# Patient Record
Sex: FEMALE | Race: WHITE | NOT HISPANIC OR LATINO | URBAN - NONMETROPOLITAN AREA
[De-identification: names, ages, dates, MRNs, and addresses within clinical notes are randomized per-mention and may not be internally consistent; named-entity substitution may affect disease eponyms.]

---

## 2017-01-26 ENCOUNTER — OFFICE VISIT (OUTPATIENT)
Dept: CARDIOLOGY | Facility: CLINIC | Age: 52
End: 2017-01-26

## 2017-01-26 ENCOUNTER — TELEPHONE (OUTPATIENT)
Dept: CARDIOLOGY | Facility: CLINIC | Age: 52
End: 2017-01-26

## 2017-01-26 VITALS
WEIGHT: 293 LBS | SYSTOLIC BLOOD PRESSURE: 113 MMHG | HEIGHT: 60 IN | RESPIRATION RATE: 16 BRPM | BODY MASS INDEX: 57.52 KG/M2 | HEART RATE: 63 BPM | DIASTOLIC BLOOD PRESSURE: 58 MMHG

## 2017-01-26 DIAGNOSIS — R07.2 PRECORDIAL PAIN: Primary | ICD-10-CM

## 2017-01-26 DIAGNOSIS — I10 ESSENTIAL HYPERTENSION: ICD-10-CM

## 2017-01-26 DIAGNOSIS — I25.10 ASCVD (ARTERIOSCLEROTIC CARDIOVASCULAR DISEASE): ICD-10-CM

## 2017-01-26 DIAGNOSIS — E78.5 DYSLIPIDEMIA: ICD-10-CM

## 2017-01-26 PROCEDURE — 93000 ELECTROCARDIOGRAM COMPLETE: CPT | Performed by: INTERNAL MEDICINE

## 2017-01-26 PROCEDURE — 99214 OFFICE O/P EST MOD 30 MIN: CPT | Performed by: INTERNAL MEDICINE

## 2017-01-26 RX ORDER — CYCLOBENZAPRINE HCL 5 MG
5 TABLET ORAL 3 TIMES DAILY PRN
COMMUNITY

## 2017-01-26 RX ORDER — MONTELUKAST SODIUM 10 MG/1
10 TABLET ORAL NIGHTLY
COMMUNITY

## 2017-01-26 RX ORDER — PROMETHAZINE HYDROCHLORIDE 25 MG/1
25 TABLET ORAL EVERY 6 HOURS PRN
COMMUNITY
End: 2017-06-07

## 2017-01-26 RX ORDER — LOSARTAN POTASSIUM 50 MG/1
50 TABLET ORAL DAILY
COMMUNITY

## 2017-01-26 RX ORDER — NITROGLYCERIN 0.4 MG/1
0.4 TABLET SUBLINGUAL
COMMUNITY

## 2017-01-26 RX ORDER — POTASSIUM CHLORIDE 1.5 G/1.77G
20 POWDER, FOR SOLUTION ORAL DAILY
COMMUNITY
End: 2017-06-07

## 2017-01-26 RX ORDER — INSULIN GLARGINE 100 [IU]/ML
50 INJECTION, SOLUTION SUBCUTANEOUS DAILY
COMMUNITY

## 2017-01-26 RX ORDER — SPIRONOLACTONE 50 MG/1
50 TABLET, FILM COATED ORAL DAILY
COMMUNITY
End: 2017-08-17

## 2017-01-26 NOTE — TELEPHONE ENCOUNTER
Called and requested her Echo.     ----- Message from Sacha Shaver MD sent at 1/26/2017 11:48 AM EST -----   Please obtain echocardiogram from [facility].

## 2017-01-26 NOTE — LETTER
January 26, 2017     Rosita Flores MD  [address] [place] [id number]    Patient: Guillermina Mcqueen   YOB: 1965   Date of Visit: 1/26/2017       Dear Dr. Mark MD:    Thank you for referring Guillermina Mcqueen to me for evaluation. Below are the relevant portions of my assessment and plan of care.    If you have questions, please do not hesitate to call me. I look forward to following Guillermina along with you.         Sincerely,        Sacha Shaver MD        CC: No Recipients  Sacha Shaver MD  1/26/2017 12:58 PM  Signed  Rosita Flores MD  Guillermina Mcqueen  1965 01/26/2017    Patient Active Problem List   Diagnosis   • Chest pain   • COPD (chronic obstructive pulmonary disease)   • HTN (hypertension)   • Dyslipidemia   • ASCVD (arteriosclerotic cardiovascular disease)       Dear Rosita Flores MD:    Subjective     Guillermina Mcqueen is a 51 y.o. female with the above medical problems who is here today for follow-up.  According to the patient she has continued to present with shortness of breath and was recently hospitalized for severe COPD as well as an episode of respiratory arrest that required resuscitation.  She comes in today for scheduled follow-up and states she is still having episodes of midsternal oppressive chest pain that she describes as lasting 15-20 minutes and resolving spontaneously.  She states they are not associated with exertion but she does not do significant physical activity.  She states they had no ameliorating or exacerbating factors and the onset is random.  She also complains of lower extremity edema that appears to be related to venostasis due to her morbid obesity      Current Outpatient Prescriptions:   •  aspirin 81 MG chewable tablet, Chew., Disp: , Rfl:   •  citalopram (CeleXA) 20 MG tablet, Take 20 mg by mouth daily., Disp: , Rfl:   •  cloNIDine (CATAPRES) 0.1 MG tablet, Take  by mouth., Disp: , Rfl:   •   cyclobenzaprine (FLEXERIL) 5 MG tablet, Take 5 mg by mouth 3 (Three) Times a Day As Needed for muscle spasms., Disp: , Rfl:   •  diltiazem (CARDIZEM) 90 MG tablet, Take 240 mg by mouth daily., Disp: , Rfl:   •  docusate calcium (SURFAK) 240 MG capsule, Take  by mouth., Disp: , Rfl:   •  furosemide (LASIX) 80 MG tablet, Take 80 mg by mouth. 80mg AM, 40mg PM., Disp: , Rfl:   •  glipiZIDE (GLUCOTROL) 10 MG tablet, Take  by mouth., Disp: , Rfl:   •  hydrALAZINE (APRESOLINE) 25 MG tablet, Take 50 mg by mouth 3 (three) times a day., Disp: , Rfl:   •  HYDROcodone-acetaminophen (LORTAB 7.5) 7.5-500 MG per tablet, Take  by mouth., Disp: , Rfl:   •  insulin glargine (LANTUS) 100 UNIT/ML injection, Inject 50 Units under the skin Daily., Disp: , Rfl:   •  insulin lispro (humaLOG) 100 UNIT/ML injection, Inject 25 Units under the skin 4 (Four) Times a Day., Disp: , Rfl:   •  ipratropium (ATROVENT HFA) 17 MCG/ACT inhaler, Inhale 2 puffs 4 (Four) Times a Day., Disp: , Rfl:   •  isosorbide mononitrate (IMDUR) 30 MG 24 hr tablet, Take  by mouth., Disp: , Rfl:   •  levothyroxine (SYNTHROID, LEVOTHROID) 125 MCG tablet, Take 150 mcg by mouth., Disp: , Rfl:   •  LORazepam (ATIVAN) 0.5 MG tablet, Take 0.5 mg by mouth 2 (two) times a day., Disp: , Rfl:   •  losartan (COZAAR) 50 MG tablet, Take 50 mg by mouth Daily., Disp: , Rfl:   •  metoprolol tartrate (LOPRESSOR) 25 MG tablet, TAKE 1 TABLET BY MOUTH TWICE A DAY, Disp: 60 tablet, Rfl: 5  •  mometasone-formoterol (DULERA 100) 100-5 MCG/ACT inhaler, Inhale 2 puffs 2 (two) times a day., Disp: , Rfl:   •  montelukast (SINGULAIR) 10 MG tablet, Take 10 mg by mouth Every Night., Disp: , Rfl:   •  nitroglycerin (NITROSTAT) 0.4 MG SL tablet, Place 0.4 mg under the tongue Every 5 (Five) Minutes As Needed for chest pain. Take no more than 3 doses in 15 minutes., Disp: , Rfl:   •  pantoprazole (PROTONIX) 40 MG pack packet, Take  by mouth., Disp: , Rfl:   •  potassium chloride (K-DUR,KLOR-CON) 20 MEQ  CR tablet, Take 20 mEq by mouth daily., Disp: , Rfl:   •  potassium chloride (KLOR-CON) 20 MEQ packet, Take 20 mEq by mouth Daily., Disp: , Rfl:   •  pravastatin (PRAVACHOL) 20 MG tablet, Take 1 tablet by mouth Daily., Disp: 30 tablet, Rfl: 2  •  pregabalin (LYRICA) 100 MG capsule, Take 100 mg by mouth 3 (three) times a day., Disp: , Rfl:   •  promethazine (PHENERGAN) 25 MG tablet, Take 25 mg by mouth Every 6 (Six) Hours As Needed for nausea or vomiting., Disp: , Rfl:   •  ranitidine (ZANTAC) 150 MG tablet, Take 150 mg by mouth 2 (two) times a day., Disp: , Rfl:   •  rOPINIRole (REQUIP) 1 MG tablet, Take  by mouth., Disp: , Rfl:   •  spironolactone (ALDACTONE) 50 MG tablet, Take 50 mg by mouth Daily., Disp: , Rfl:   •  nitroglycerin (NITRODUR) 0.4 MG/HR patch, Place 1 patch on the skin daily., Disp: , Rfl:     The following portions of the patient's history were reviewed and updated as appropriate: allergies, current medications, past family history, past medical history, past social history, past surgical history and problem list.    Review of Systems   Constitution: Positive for chills. Negative for diaphoresis and fever.   HENT: Positive for headaches and nosebleeds. Negative for sore throat.    Eyes: Negative for blurred vision, pain and redness.   Cardiovascular: Positive for chest pain, leg swelling, orthopnea and paroxysmal nocturnal dyspnea. Negative for palpitations and syncope.   Respiratory: Positive for shortness of breath. Negative for cough, hemoptysis and wheezing.    Endocrine: Negative for cold intolerance and heat intolerance.   Hematologic/Lymphatic: Does not bruise/bleed easily.   Skin: Negative for rash.   Musculoskeletal: Positive for arthritis, back pain, joint pain, joint swelling and stiffness. Negative for myalgias.   Gastrointestinal: Positive for abdominal pain, constipation, diarrhea and heartburn. Negative for hematemesis, hematochezia, melena, nausea and vomiting.   Genitourinary:  "Negative for dysuria and hematuria.   Neurological: Negative for dizziness, focal weakness and numbness.   Psychiatric/Behavioral: Positive for depression. The patient is nervous/anxious.        Objective   Blood pressure 113/58, pulse 63, resp. rate 16, height 60\" (152.4 cm), weight (!) 356 lb (161 kg).    Physical Exam   Constitutional: She is oriented to person, place, and time. She appears well-developed and well-nourished.   Morbidly obese WF sitting on wheelchair. SHe has a nasal cannula in place.   HENT:   Mouth/Throat: Oropharynx is clear and moist.   Eyes: EOM are normal. Pupils are equal, round, and reactive to light.   Neck: Neck supple. No JVD present. No tracheal deviation present. No thyromegaly present.   Cardiovascular: Normal rate, regular rhythm, S1 normal and S2 normal.  Exam reveals no gallop and no friction rub.    No murmur heard.  Distant heart sounds.   Pulmonary/Chest: Effort normal. No respiratory distress. She has no wheezes. She has rales.   Distant breath sounds.   Abdominal: Soft. Bowel sounds are normal. She exhibits no mass. There is no tenderness.   Musculoskeletal: Normal range of motion. She exhibits no edema.   Lymphadenopathy:     She has no cervical adenopathy.   Neurological: She is alert and oriented to person, place, and time.   Skin: Skin is warm and dry. No rash noted.   Psychiatric: She has a normal mood and affect.         ECG 12 Lead  Date/Time: 1/26/2017 11:21 AM  Performed by: FRANCISCO SEAY  Authorized by: FRANCISCO SEAY   Comparison: compared with previous ECG from 6/29/2016  Similar to previous ECG  Rhythm: sinus rhythm  Rate: normal  BPM: 68  Conduction: conduction normal  ST Segments: ST segments normal  T Waves: T waves normal  QRS axis: normal  Other: no other findings  Clinical impression: non-specific ECG and low voltage            Assessment/Plan     1.  Chest pain: Patient with chest pain concerning for coronary artery " disease.  However she states she believes this is related to recent CPR maneuvers.  I discussed with her the option of undergoing stress test that she has known coronary artery disease and worsening chest pains that she is unwilling to do this at this time.  She states she wants to monitor the chest pain for another month and will consider further testing is is still present on next visit.  At this point will abide by the patient's wishes and hold off on further testing.    2.  ASCVD: Patient with a history of ASCVD currently on aspirin, statin, beta blocker, ACE inhibitor, isosorbide mononitrate and spironolactone.  At this point would continue current regimen.    3.  Hypertension: Patient with history of hypertension which is well-controlled on current regimen.  At this point would continue.    4.  Dyslipidemia: Patient with history of dyslipidemia.  She states she has had a recent lipid panel done by her primary care provider.  We will try to obtain.       Diagnosis Plan   1. Precordial pain  ECG 12 Lead   2. ASCVD (arteriosclerotic cardiovascular disease)     3. Essential hypertension     4. Dyslipidemia              Return in about 1 month (around 2/26/2017).    I appreciate the opportunity to participate in this patient's cardiovascular care.    Best Regards    Sacha Yarbrough

## 2017-01-26 NOTE — MR AVS SNAPSHOT
Guillermina Mcqueen   1/26/2017 11:00 AM   Office Visit    Dept Phone:  [phone]   Encounter #:  [id number]    Provider:  Sacha Shaver MD   Department:  [facility] CARDIOLOGY                Your Full Care Plan              Your Updated Medication List          This list is accurate as of: 1/26/17 11:51 AM.  Always use your most recent med list.                aspirin 81 MG chewable tablet       citalopram 20 MG tablet   Commonly known as:  CeleXA       CloNIDine 0.1 MG tablet   Commonly known as:  CATAPRES       cyclobenzaprine 5 MG tablet   Commonly known as:  FLEXERIL       diltiaZEM 90 MG tablet   Commonly known as:  CARDIZEM       docusate calcium 240 MG capsule   Commonly known as:  SURFAK       furosemide 80 MG tablet   Commonly known as:  LASIX       glipiZIDE 10 MG tablet   Commonly known as:  GLUCOTROL       hydrALAZINE 25 MG tablet   Commonly known as:  APRESOLINE       IMDUR 30 MG 24 hr tablet   Generic drug:  isosorbide mononitrate       insulin glargine 100 UNIT/ML injection   Commonly known as:  LANTUS       insulin lispro 100 UNIT/ML injection   Commonly known as:  humaLOG       ipratropium 17 MCG/ACT inhaler   Commonly known as:  ATROVENT HFA       levothyroxine 125 MCG tablet   Commonly known as:  SYNTHROID, LEVOTHROID       LORazepam 0.5 MG tablet   Commonly known as:  ATIVAN       LORTAB 7.5 7.5-500 MG per tablet   Generic drug:  HYDROcodone-acetaminophen       losartan 50 MG tablet   Commonly known as:  COZAAR       metoprolol tartrate 25 MG tablet   Commonly known as:  LOPRESSOR   TAKE 1 TABLET BY MOUTH TWICE A DAY       mometasone-formoterol 100-5 MCG/ACT inhaler   Commonly known as:  DULERA 100       montelukast 10 MG tablet   Commonly known as:  SINGULAIR       * nitroglycerin 0.4 MG/HR patch   Commonly known as:  NITRODUR       * nitroglycerin 0.4 MG SL tablet   Commonly known as:  NITROSTAT       potassium chloride 20 MEQ CR tablet      Commonly known as:  K-DUR,KLOR-CON       potassium chloride 20 MEQ packet   Commonly known as:  KLOR-CON       pravastatin 20 MG tablet   Commonly known as:  PRAVACHOL   Take 1 tablet by mouth Daily.       pregabalin 100 MG capsule   Commonly known as:  LYRICA       promethazine 25 MG tablet   Commonly known as:  PHENERGAN       PROTONIX 40 MG pack packet   Generic drug:  pantoprazole       raNITIdine 150 MG tablet   Commonly known as:  ZANTAC       rOPINIRole 1 MG tablet   Commonly known as:  REQUIP       spironolactone 50 MG tablet   Commonly known as:  ALDACTONE       * Notice:  This list has 2 medication(s) that are the same as other medications prescribed for you. Read the directions carefully, and ask your doctor or other care provider to review them with you.            We Performed the Following     ECG 12 Lead       You Were Diagnosed With        Codes Comments    Precordial pain    -  Primary ICD-10-CM: R07.2  ICD-9-CM: 786.51     Essential hypertension     ICD-10-CM: I10  ICD-9-CM: 401.9     Dyslipidemia     ICD-10-CM: E78.5  ICD-9-CM: 272.4       Instructions     None    Patient Instructions History      Upcoming Appointments     Visit Type Date Time Department    FOLLOW UP 1/26/2017 11:00 AM [facility] [place]    FOLLOW UP 2/8/2017 10:00 AM [facility] [place]    FOLLOW UP 3/28/2017 10:40 AM [facility] Signup     Our records indicate that you have an active [employer] account.    You can view your After Visit Summary by going to [employer] and logging in with your Whois username and password.  If you don't have a Whois username and password but a parent or guardian has access to your record, the parent or guardian should login with their own Whois username and password and access your record to view the After Visit Summary.    If you have questions, you can email [email or web address] or call [phone] to talk to  "our MyChart staff.  Remember, MyChart is NOT to be used for urgent needs.  For medical emergencies, dial 911.               Other Info from Your Visit           Your Appointments     Feb 08, 2017 10:00 AM EST   Follow Up with CORINE Burgess MD   [facility] (--)    [address] [facility] [address]  [place] [id number] [phone]           Arrive 15 minutes prior to appointment.            Mar 28, 2017 10:40 AM EDT   Follow Up with Sacha Shaver MD   [facility] (--)    [address]  [place] [id number] [phone]           Arrive 15 minutes prior to appointment.              Allergies     Codeine      Oxycodone      Penicillins        Reason for Visit     Follow-up 6 mos    Chest Pain \"some\"    Shortness of Breath           Vital Signs     Blood Pressure Pulse Respirations Height Weight Body Mass Index    113/58 63 16 60\" (152.4 cm) 356 lb (161 kg) 69.53 kg/m2    Smoking Status                   Never Smoker           Problems and Diagnoses Noted     Chest pain    Dyslipidemia    High blood pressure        "

## 2017-01-26 NOTE — PROGRESS NOTES
Rosita Flores MD  Guillermina Mcqueen  1965 01/26/2017    Patient Active Problem List   Diagnosis   • Chest pain   • COPD (chronic obstructive pulmonary disease)   • HTN (hypertension)   • Dyslipidemia   • ASCVD (arteriosclerotic cardiovascular disease)       Dear Rosita Flores MD:    Chet Mcqueen is a 51 y.o. female with the above medical problems who is here today for follow-up.  According to the patient she has continued to present with shortness of breath and was recently hospitalized for severe COPD as well as an episode of respiratory arrest that required resuscitation.  She comes in today for scheduled follow-up and states she is still having episodes of midsternal oppressive chest pain that she describes as lasting 15-20 minutes and resolving spontaneously.  She states they are not associated with exertion but she does not do significant physical activity.  She states they had no ameliorating or exacerbating factors and the onset is random.  She also complains of lower extremity edema that appears to be related to venostasis due to her morbid obesity      Current Outpatient Prescriptions:   •  aspirin 81 MG chewable tablet, Chew., Disp: , Rfl:   •  citalopram (CeleXA) 20 MG tablet, Take 20 mg by mouth daily., Disp: , Rfl:   •  cloNIDine (CATAPRES) 0.1 MG tablet, Take  by mouth., Disp: , Rfl:   •  cyclobenzaprine (FLEXERIL) 5 MG tablet, Take 5 mg by mouth 3 (Three) Times a Day As Needed for muscle spasms., Disp: , Rfl:   •  diltiazem (CARDIZEM) 90 MG tablet, Take 240 mg by mouth daily., Disp: , Rfl:   •  docusate calcium (SURFAK) 240 MG capsule, Take  by mouth., Disp: , Rfl:   •  furosemide (LASIX) 80 MG tablet, Take 80 mg by mouth. 80mg AM, 40mg PM., Disp: , Rfl:   •  glipiZIDE (GLUCOTROL) 10 MG tablet, Take  by mouth., Disp: , Rfl:   •  hydrALAZINE (APRESOLINE) 25 MG tablet, Take 50 mg by mouth 3 (three) times a day., Disp: , Rfl:   •  HYDROcodone-acetaminophen (LORTAB 7.5) 7.5-500 MG  per tablet, Take  by mouth., Disp: , Rfl:   •  insulin glargine (LANTUS) 100 UNIT/ML injection, Inject 50 Units under the skin Daily., Disp: , Rfl:   •  insulin lispro (humaLOG) 100 UNIT/ML injection, Inject 25 Units under the skin 4 (Four) Times a Day., Disp: , Rfl:   •  ipratropium (ATROVENT HFA) 17 MCG/ACT inhaler, Inhale 2 puffs 4 (Four) Times a Day., Disp: , Rfl:   •  isosorbide mononitrate (IMDUR) 30 MG 24 hr tablet, Take  by mouth., Disp: , Rfl:   •  levothyroxine (SYNTHROID, LEVOTHROID) 125 MCG tablet, Take 150 mcg by mouth., Disp: , Rfl:   •  LORazepam (ATIVAN) 0.5 MG tablet, Take 0.5 mg by mouth 2 (two) times a day., Disp: , Rfl:   •  losartan (COZAAR) 50 MG tablet, Take 50 mg by mouth Daily., Disp: , Rfl:   •  metoprolol tartrate (LOPRESSOR) 25 MG tablet, TAKE 1 TABLET BY MOUTH TWICE A DAY, Disp: 60 tablet, Rfl: 5  •  mometasone-formoterol (DULERA 100) 100-5 MCG/ACT inhaler, Inhale 2 puffs 2 (two) times a day., Disp: , Rfl:   •  montelukast (SINGULAIR) 10 MG tablet, Take 10 mg by mouth Every Night., Disp: , Rfl:   •  nitroglycerin (NITROSTAT) 0.4 MG SL tablet, Place 0.4 mg under the tongue Every 5 (Five) Minutes As Needed for chest pain. Take no more than 3 doses in 15 minutes., Disp: , Rfl:   •  pantoprazole (PROTONIX) 40 MG pack packet, Take  by mouth., Disp: , Rfl:   •  potassium chloride (K-DUR,KLOR-CON) 20 MEQ CR tablet, Take 20 mEq by mouth daily., Disp: , Rfl:   •  potassium chloride (KLOR-CON) 20 MEQ packet, Take 20 mEq by mouth Daily., Disp: , Rfl:   •  pravastatin (PRAVACHOL) 20 MG tablet, Take 1 tablet by mouth Daily., Disp: 30 tablet, Rfl: 2  •  pregabalin (LYRICA) 100 MG capsule, Take 100 mg by mouth 3 (three) times a day., Disp: , Rfl:   •  promethazine (PHENERGAN) 25 MG tablet, Take 25 mg by mouth Every 6 (Six) Hours As Needed for nausea or vomiting., Disp: , Rfl:   •  ranitidine (ZANTAC) 150 MG tablet, Take 150 mg by mouth 2 (two) times a day., Disp: , Rfl:   •  rOPINIRole (REQUIP) 1 MG  "tablet, Take  by mouth., Disp: , Rfl:   •  spironolactone (ALDACTONE) 50 MG tablet, Take 50 mg by mouth Daily., Disp: , Rfl:   •  nitroglycerin (NITRODUR) 0.4 MG/HR patch, Place 1 patch on the skin daily., Disp: , Rfl:     The following portions of the patient's history were reviewed and updated as appropriate: allergies, current medications, past family history, past medical history, past social history, past surgical history and problem list.    Review of Systems   Constitution: Positive for chills. Negative for diaphoresis and fever.   HENT: Positive for headaches and nosebleeds. Negative for sore throat.    Eyes: Negative for blurred vision, pain and redness.   Cardiovascular: Positive for chest pain, leg swelling, orthopnea and paroxysmal nocturnal dyspnea. Negative for palpitations and syncope.   Respiratory: Positive for shortness of breath. Negative for cough, hemoptysis and wheezing.    Endocrine: Negative for cold intolerance and heat intolerance.   Hematologic/Lymphatic: Does not bruise/bleed easily.   Skin: Negative for rash.   Musculoskeletal: Positive for arthritis, back pain, joint pain, joint swelling and stiffness. Negative for myalgias.   Gastrointestinal: Positive for abdominal pain, constipation, diarrhea and heartburn. Negative for hematemesis, hematochezia, melena, nausea and vomiting.   Genitourinary: Negative for dysuria and hematuria.   Neurological: Negative for dizziness, focal weakness and numbness.   Psychiatric/Behavioral: Positive for depression. The patient is nervous/anxious.        Objective   Blood pressure 113/58, pulse 63, resp. rate 16, height 60\" (152.4 cm), weight (!) 356 lb (161 kg).    Physical Exam   Constitutional: She is oriented to person, place, and time. She appears well-developed and well-nourished.   Morbidly obese WF sitting on wheelchair. SHe has a nasal cannula in place.   HENT:   Mouth/Throat: Oropharynx is clear and moist.   Eyes: EOM are normal. Pupils are " equal, round, and reactive to light.   Neck: Neck supple. No JVD present. No tracheal deviation present. No thyromegaly present.   Cardiovascular: Normal rate, regular rhythm, S1 normal and S2 normal.  Exam reveals no gallop and no friction rub.    No murmur heard.  Distant heart sounds.   Pulmonary/Chest: Effort normal. No respiratory distress. She has no wheezes. She has rales.   Distant breath sounds.   Abdominal: Soft. Bowel sounds are normal. She exhibits no mass. There is no tenderness.   Musculoskeletal: Normal range of motion. She exhibits no edema.   Lymphadenopathy:     She has no cervical adenopathy.   Neurological: She is alert and oriented to person, place, and time.   Skin: Skin is warm and dry. No rash noted.   Psychiatric: She has a normal mood and affect.         ECG 12 Lead  Date/Time: 1/26/2017 11:21 AM  Performed by: FRANCISCO SEAY  Authorized by: FRANCISCO SEAY   Comparison: compared with previous ECG from 6/29/2016  Similar to previous ECG  Rhythm: sinus rhythm  Rate: normal  BPM: 68  Conduction: conduction normal  ST Segments: ST segments normal  T Waves: T waves normal  QRS axis: normal  Other: no other findings  Clinical impression: non-specific ECG and low voltage            Assessment/Plan     1.  Chest pain: Patient with chest pain concerning for coronary artery disease.  However she states she believes this is related to recent CPR maneuvers.  I discussed with her the option of undergoing stress test that she has known coronary artery disease and worsening chest pains that she is unwilling to do this at this time.  She states she wants to monitor the chest pain for another month and will consider further testing is is still present on next visit.  At this point will abide by the patient's wishes and hold off on further testing.    2.  ASCVD: Patient with a history of ASCVD currently on aspirin, statin, beta blocker, ACE inhibitor, isosorbide mononitrate  and spironolactone.  At this point would continue current regimen.    3.  Hypertension: Patient with history of hypertension which is well-controlled on current regimen.  At this point would continue.    4.  Dyslipidemia: Patient with history of dyslipidemia.  She states she has had a recent lipid panel done by her primary care provider.  We will try to obtain.       Diagnosis Plan   1. Precordial pain  ECG 12 Lead   2. ASCVD (arteriosclerotic cardiovascular disease)     3. Essential hypertension     4. Dyslipidemia              Return in about 1 month (around 2/26/2017).    I appreciate the opportunity to participate in this patient's cardiovascular care.    Best Regards    Sacha Yarbrough

## 2017-02-06 RX ORDER — PRAVASTATIN SODIUM 20 MG
TABLET ORAL
Qty: 30 TABLET | Refills: 2 | Status: SHIPPED | OUTPATIENT
Start: 2017-02-06 | End: 2017-04-29 | Stop reason: SDUPTHER

## 2017-02-08 ENCOUNTER — OFFICE VISIT (OUTPATIENT)
Dept: PULMONOLOGY | Facility: CLINIC | Age: 52
End: 2017-02-08

## 2017-02-08 VITALS
HEART RATE: 72 BPM | WEIGHT: 293 LBS | BODY MASS INDEX: 57.52 KG/M2 | HEIGHT: 60 IN | OXYGEN SATURATION: 97 % | TEMPERATURE: 98.3 F | SYSTOLIC BLOOD PRESSURE: 152 MMHG | DIASTOLIC BLOOD PRESSURE: 88 MMHG

## 2017-02-08 DIAGNOSIS — G47.33 OSA ON CPAP: ICD-10-CM

## 2017-02-08 DIAGNOSIS — Z99.89 OSA ON CPAP: ICD-10-CM

## 2017-02-08 DIAGNOSIS — J45.40 MODERATE PERSISTENT ASTHMA WITHOUT COMPLICATION: Primary | ICD-10-CM

## 2017-02-08 DIAGNOSIS — E66.01 MORBID OBESITY WITH BMI OF 60.0-69.9, ADULT (HCC): ICD-10-CM

## 2017-02-08 PROCEDURE — 99214 OFFICE O/P EST MOD 30 MIN: CPT | Performed by: INTERNAL MEDICINE

## 2017-02-08 RX ORDER — ARFORMOTEROL TARTRATE 15 UG/2ML
15 SOLUTION RESPIRATORY (INHALATION)
Qty: 120 ML | Refills: 6 | Status: SHIPPED | OUTPATIENT
Start: 2017-02-08

## 2017-02-08 RX ORDER — BUDESONIDE 0.5 MG/2ML
0.5 INHALANT ORAL EVERY 12 HOURS
Qty: 60 EACH | Refills: 6 | Status: SHIPPED | OUTPATIENT
Start: 2017-02-08

## 2017-02-08 NOTE — PROGRESS NOTES
Subjective   Guillermina Mcqueen is a 51 y.o. female who is being seen for Dyspnea on exertion    History of Present Illness   Patient returns after pulmonary function test.  Symptomatically she still continues to have significant shortness of breath on mild exertion.  Also reports wheezing on and off.    Past Medical History   Diagnosis Date   • CAD (coronary artery disease)    • COPD (chronic obstructive pulmonary disease)    • Diabetes mellitus    • Dyslipidemia    • Hyperlipidemia    • Hypertension    • Sleep apnea      Past Surgical History   Procedure Laterality Date   • Cholecystectomy     • Appendectomy     • Hysterectomy       Family History   Problem Relation Age of Onset   • Heart disease Mother    • Heart disease Father    • Heart disease Sister    • Heart disease Brother       reports that she has never smoked. She does not have any smokeless tobacco history on file.  Allergies   Allergen Reactions   • Codeine    • Oxycodone    • Penicillins            The following portions of the patient's history were reviewed and updated as appropriate: allergies, current medications, past family history, past medical history, past social history, past surgical history and problem list.    Review of Systems   Constitutional: Negative for appetite change, chills, diaphoresis and unexpected weight change.   HENT: Negative for sore throat, trouble swallowing and voice change.    Eyes: Negative for visual disturbance.   Respiratory: Positive for shortness of breath. Negative for apnea, cough, choking and wheezing.    Cardiovascular: Positive for leg swelling. Negative for chest pain and palpitations.   Gastrointestinal: Negative for abdominal pain, constipation, diarrhea, nausea and vomiting.   Endocrine: Negative for cold intolerance, heat intolerance, polydipsia, polyphagia and polyuria.   Genitourinary: Negative for difficulty urinating and dysuria.   Musculoskeletal: Negative for gait problem.   Skin: Negative for rash and  "wound.   Neurological: Negative for syncope and light-headedness.   Hematological: Negative for adenopathy.   Psychiatric/Behavioral: Negative for agitation, behavioral problems and confusion.   All other systems reviewed and are negative.      Objective   Visit Vitals   • /88 (BP Location: Left arm, Patient Position: Sitting, Cuff Size: Adult)   • Pulse 72   • Temp 98.3 °F (36.8 °C) (Oral)   • Ht 60\" (152.4 cm)   • Wt (!) 350 lb (159 kg)   • SpO2 97%   • BMI 68.35 kg/m2     Physical Exam   Constitutional: She is oriented to person, place, and time. She appears well-developed and well-nourished.   HENT:   Head: Normocephalic and atraumatic.   Nose: Mucosal edema present.    Crowded oropharynx.   Eyes: EOM are normal. Pupils are equal, round, and reactive to light.   Neck: Normal range of motion. Neck supple.   Thick neck   Cardiovascular: Normal rate, regular rhythm and normal heart sounds.    Pulmonary/Chest: Effort normal. She has wheezes. She has rales.   Expiratory wheezing bilaterally, prominent posteriorly   Abdominal: Soft. Bowel sounds are normal.   Protuberant belly, abdominal obesity   Musculoskeletal: Normal range of motion. She exhibits edema.   Neurological: She is alert and oriented to person, place, and time.   Skin: Skin is warm and dry.   Psychiatric: She has a normal mood and affect. Her behavior is normal.   Nursing note and vitals reviewed.        Radiology:      Lab Results:  CBC  Lab Results   Component Value Date    WBC 5.9 06/29/2015    RBC 3.95 (L) 06/29/2015    HGB 11.1 (L) 06/29/2015    HCT 35.8 (L) 06/29/2015    MCV 90.6 06/29/2015    MCH 28.1 06/29/2015    MCHC 31.0 (L) 06/29/2015    RDW 13.8 06/29/2015     06/29/2015    NEUTRORELPCT 58.5 01/19/2015    LYMPHORELPCT 27.9 01/19/2015    MONORELPCT 9.5 01/19/2015    EOSRELPCT 3.5 (H) 01/19/2015    BASORELPCT 0.4 01/19/2015    NEUTROABS 3.33 01/19/2015    LYMPHSABS 1.59 01/19/2015    MONOSABS 0.54 01/19/2015    EOSABS 0.20 " 01/19/2015    BASOSABS 0.02 01/19/2015       CMP  Lab Results   Component Value Date     06/29/2015    K 3.8 06/29/2015    CL 96 (L) 06/29/2015    CO2 39.0 (H) 06/29/2015    GLUCOSE 178 (H) 06/29/2015    BUN 10 06/29/2015    CREATININE 0.72 06/29/2015    CALCIUM 9.7 06/29/2015    AST 28 01/20/2015    ALKPHOS 67 01/20/2015    BILITOT 0.6 01/20/2015    ALT 37 01/20/2015    BCR 13.5 06/29/2015    ANIONGAP 5.0 06/29/2015    ALBUMIN 3.6 01/20/2015    PROTEINTOT 6.7 01/20/2015        Assessment      ICD-10-CM ICD-9-CM   1. Moderate persistent asthma without complication J45.40 493.90   2. EDVIN on CPAP G47.33 327.23   3. Morbid obesity with BMI of 60.0-69.9, adult E66.01 278.01    Z68.44 V85.44                DISCUSSION:  I have reviewed the pulmonary function test which shows significant airflow limitation, FEV1 was reduced to 0.98 L which was only 35% predicted and improved by 20% after bronchodilator.  FEV1 to FVC ratio was relatively high, 80.  Lung volumes showed low-normal total lung capacity and initially there was evidence of air trapping with RV to TLC ratio increased 295% predicted.  Diffusion capacity was normal.    This findings along with her symptoms are consistent with obesity related restrictive process with superimposed bronchial asthma.  I have discussed the test results with the patient and her .  They understood it.  Patient has nebulizer machine and appears more comfortable with the nebulizer than inhalers.  I asked her to continue using DuoNeb as she is using additionally I would add Brovana and Pulmicort to the nebulizer regimen.  She also uses BiPAP and I encouraged her to continue to use that.    I like to see her again in approximately 6 to reduced from now to evaluate the response to current therapy.  Meanwhile we discussed the importance of weight loss and she said she is going to try her best.    Plan      New Medications Ordered This Visit   Medications   • arformoterol (BROVANA)  15 MCG/2ML nebulizer solution     Sig: Take 2 mL by nebulization 2 (Two) Times a Day.     Dispense:  120 mL     Refill:  6   • budesonide (PULMICORT) 0.5 MG/2ML nebulizer solution     Sig: Take 2 mL by nebulization Every 12 (Twelve) Hours.     Dispense:  60 each     Refill:  6                  Iza Burgess MD, FCCP, FAASM  Pulmonary, Critical Care, and Sleep Medicine

## 2017-02-28 ENCOUNTER — TELEPHONE (OUTPATIENT)
Dept: PULMONOLOGY | Facility: CLINIC | Age: 52
End: 2017-02-28

## 2017-02-28 RX ORDER — FLUTICASONE PROPIONATE 110 UG/1
2 AEROSOL, METERED RESPIRATORY (INHALATION)
Qty: 12 G | Refills: 11 | Status: SHIPPED | OUTPATIENT
Start: 2017-02-28

## 2017-04-19 ENCOUNTER — OFFICE VISIT (OUTPATIENT)
Dept: PULMONOLOGY | Facility: CLINIC | Age: 52
End: 2017-04-19

## 2017-04-19 VITALS
HEIGHT: 60 IN | BODY MASS INDEX: 57.52 KG/M2 | SYSTOLIC BLOOD PRESSURE: 158 MMHG | HEART RATE: 80 BPM | OXYGEN SATURATION: 95 % | TEMPERATURE: 98 F | WEIGHT: 293 LBS | DIASTOLIC BLOOD PRESSURE: 78 MMHG

## 2017-04-19 DIAGNOSIS — Z99.89 OBSTRUCTIVE SLEEP APNEA ON CPAP: ICD-10-CM

## 2017-04-19 DIAGNOSIS — G47.33 OBSTRUCTIVE SLEEP APNEA ON CPAP: ICD-10-CM

## 2017-04-19 DIAGNOSIS — J45.40 MODERATE PERSISTENT ASTHMA WITHOUT COMPLICATION: Primary | ICD-10-CM

## 2017-04-19 DIAGNOSIS — E66.01 MORBID OBESITY WITH BMI OF 70 AND OVER, ADULT (HCC): ICD-10-CM

## 2017-04-19 DIAGNOSIS — I27.81 COR PULMONALE (HCC): ICD-10-CM

## 2017-04-19 PROCEDURE — 99214 OFFICE O/P EST MOD 30 MIN: CPT | Performed by: INTERNAL MEDICINE

## 2017-04-19 RX ORDER — DOXYCYCLINE HYCLATE 100 MG
TABLET ORAL
Refills: 0 | COMMUNITY
Start: 2017-03-30 | End: 2017-06-07

## 2017-04-19 RX ORDER — SYRINGE AND NEEDLE,INSULIN,1ML 31 GX5/16"
SYRINGE, EMPTY DISPOSABLE MISCELLANEOUS
Refills: 12 | COMMUNITY
Start: 2017-03-21

## 2017-04-19 RX ORDER — DILTIAZEM HYDROCHLORIDE 240 MG/1
CAPSULE, COATED, EXTENDED RELEASE ORAL
COMMUNITY
Start: 2017-04-12 | End: 2017-06-07 | Stop reason: SDUPTHER

## 2017-04-19 RX ORDER — FUROSEMIDE 40 MG/1
TABLET ORAL
Refills: 5 | COMMUNITY
Start: 2017-04-09 | End: 2017-08-17

## 2017-04-19 RX ORDER — CITALOPRAM 40 MG/1
20 TABLET ORAL DAILY
Refills: 5 | COMMUNITY
Start: 2017-04-06

## 2017-04-19 RX ORDER — DIPHENHYDRAMINE HCL 50 MG
CAPSULE ORAL
Refills: 5 | COMMUNITY
Start: 2017-04-09

## 2017-04-19 RX ORDER — AZITHROMYCIN 250 MG/1
TABLET, FILM COATED ORAL
Refills: 0 | COMMUNITY
Start: 2017-03-20 | End: 2017-06-07

## 2017-04-19 RX ORDER — IPRATROPIUM BROMIDE AND ALBUTEROL SULFATE 2.5; .5 MG/3ML; MG/3ML
SOLUTION RESPIRATORY (INHALATION)
Refills: 5 | COMMUNITY
Start: 2017-02-15

## 2017-04-19 RX ORDER — BLOOD-GLUCOSE METER
KIT MISCELLANEOUS
Refills: 5 | COMMUNITY
Start: 2017-04-01

## 2017-04-19 RX ORDER — HYDROCODONE BITARTRATE AND ACETAMINOPHEN 7.5; 325 MG/1; MG/1
TABLET ORAL
COMMUNITY
Start: 2017-04-17 | End: 2017-06-07

## 2017-04-19 RX ORDER — ISOSORBIDE MONONITRATE 60 MG/1
TABLET, EXTENDED RELEASE ORAL
COMMUNITY
Start: 2017-04-12 | End: 2017-06-07 | Stop reason: SDUPTHER

## 2017-04-19 RX ORDER — HYDRALAZINE HYDROCHLORIDE 50 MG/1
TABLET, FILM COATED ORAL
Refills: 5 | COMMUNITY
Start: 2017-04-09 | End: 2017-06-07

## 2017-04-26 ENCOUNTER — TELEPHONE (OUTPATIENT)
Dept: PULMONOLOGY | Facility: CLINIC | Age: 52
End: 2017-04-26

## 2017-04-26 RX ORDER — PREDNISONE 1 MG/1
5 TABLET ORAL DAILY
Qty: 30 TABLET | Refills: 2 | Status: SHIPPED | OUTPATIENT
Start: 2017-04-26 | End: 2017-07-15 | Stop reason: SDUPTHER

## 2017-05-01 RX ORDER — PRAVASTATIN SODIUM 20 MG
TABLET ORAL
Qty: 30 TABLET | Refills: 2 | Status: SHIPPED | OUTPATIENT
Start: 2017-05-01

## 2017-06-07 ENCOUNTER — OFFICE VISIT (OUTPATIENT)
Dept: CARDIOLOGY | Facility: CLINIC | Age: 52
End: 2017-06-07

## 2017-06-07 VITALS
RESPIRATION RATE: 18 BRPM | HEIGHT: 60 IN | WEIGHT: 293 LBS | HEART RATE: 62 BPM | SYSTOLIC BLOOD PRESSURE: 144 MMHG | DIASTOLIC BLOOD PRESSURE: 75 MMHG | BODY MASS INDEX: 57.52 KG/M2

## 2017-06-07 DIAGNOSIS — R07.9 CHEST PAIN, UNSPECIFIED TYPE: ICD-10-CM

## 2017-06-07 DIAGNOSIS — I25.10 ASCVD (ARTERIOSCLEROTIC CARDIOVASCULAR DISEASE): Primary | ICD-10-CM

## 2017-06-07 DIAGNOSIS — R77.8 ELEVATED TROPONIN I LEVEL: ICD-10-CM

## 2017-06-07 DIAGNOSIS — E78.5 DYSLIPIDEMIA: ICD-10-CM

## 2017-06-07 DIAGNOSIS — I10 ESSENTIAL HYPERTENSION: ICD-10-CM

## 2017-06-07 DIAGNOSIS — R00.1 SINUS BRADYCARDIA: ICD-10-CM

## 2017-06-07 DIAGNOSIS — E66.01 MORBID OBESITY, UNSPECIFIED OBESITY TYPE (HCC): ICD-10-CM

## 2017-06-07 PROCEDURE — 93000 ELECTROCARDIOGRAM COMPLETE: CPT | Performed by: PHYSICIAN ASSISTANT

## 2017-06-07 PROCEDURE — 99214 OFFICE O/P EST MOD 30 MIN: CPT | Performed by: PHYSICIAN ASSISTANT

## 2017-06-07 RX ORDER — DILTIAZEM HYDROCHLORIDE 180 MG/1
180 CAPSULE, COATED, EXTENDED RELEASE ORAL DAILY
Qty: 30 CAPSULE | Refills: 5 | Status: SHIPPED | OUTPATIENT
Start: 2017-06-07 | End: 2017-08-17 | Stop reason: SDUPTHER

## 2017-06-07 RX ORDER — ISOSORBIDE MONONITRATE 60 MG/1
90 TABLET, EXTENDED RELEASE ORAL DAILY
Qty: 45 TABLET | Refills: 5 | Status: SHIPPED | OUTPATIENT
Start: 2017-06-07 | End: 2017-08-17 | Stop reason: SDUPTHER

## 2017-06-07 NOTE — PROGRESS NOTES
Rosita Flores MD  Guillermina Mcqueen  1965 06/07/2017    Patient Active Problem List   Diagnosis   • Chest pain   • COPD (chronic obstructive pulmonary disease)   • HTN (hypertension)   • Dyslipidemia   • ASCVD (arteriosclerotic cardiovascular disease)     Dear Rosita Flores MD:    Chief Complaint   Patient presents with   • Hypertension   • Chest Pain   • Other     Pt brought med list, no recent labs, s/p hospitilization [facility] in March Subjective     Guillermina Mcqueen is a 52 y.o. female with a past medical history significant for Mild, nonobstructive atherosclerotic cardiovascular disease appreciated on cardiac catheterization 7/1/2015.  She also has a history of severe COPD with previous respiratory arrest requiring resuscitation the past.  She has a history of hypertension and dyslipidemia as well.  She recently went to  and was noted to have elevated troponin I ultra levels at 0.527 and 0.879. She states she went with shortness of breath.  She stayed 1 night and was discharged home the next day.  She has had recent chest pains which are centralized and come and go on their own.  They radiate to the left axilla.  They are associated with nausea.  No vomiting or diaphoresis.  She states she was admitted to [facility] in March 2017 and had a transthoracic echocardiogram at that time.      Current Outpatient Prescriptions:   •  arformoterol (BROVANA) 15 MCG/2ML nebulizer solution, Take 2 mL by nebulization 2 (Two) Times a Day., Disp: 120 mL, Rfl: 6  •  aspirin 81 MG chewable tablet, Chew., Disp: , Rfl:   •  budesonide (PULMICORT) 0.5 MG/2ML nebulizer solution, Take 2 mL by nebulization Every 12 (Twelve) Hours., Disp: 60 each, Rfl: 6  •  citalopram (CeleXA) 40 MG tablet, TAKE 1 TABLET BY MOUTH EVERY DAY, Disp: , Rfl: 5  •  cloNIDine (CATAPRES) 0.1 MG tablet, Take  by mouth., Disp: , Rfl:   •  [employer] STOOL SOFTENER 250 MG capsule, TAKE 1 CAPSULE EVERY DAY, Disp: , Rfl:  "5  •  cyclobenzaprine (FLEXERIL) 5 MG tablet, Take 5 mg by mouth 3 (Three) Times a Day As Needed for muscle spasms., Disp: , Rfl:   •  diltiaZEM CD (CARDIZEM CD) 240 MG 24 hr capsule, , Disp: , Rfl:   •  docusate calcium (SURFAK) 240 MG capsule, Take  by mouth., Disp: , Rfl:   •  EASY TOUCH INSULIN SYRINGE 31G X 5/16\" 1 ML misc, , Disp: , Rfl: 12  •  fluticasone (FLOVENT HFA) 110 MCG/ACT inhaler, Inhale 2 puffs 2 (Two) Times a Day., Disp: 12 g, Rfl: 11  •  FREESTYLE LITE test strip, TEST TWICE ADAY, Disp: , Rfl: 5  •  furosemide (LASIX) 40 MG tablet, TAKE 2 TABLETS BY MOUTH EVERY MORNING AND TAKE 1 TABLET EVERY EVENING, Disp: , Rfl: 5  •  furosemide (LASIX) 80 MG tablet, Take 80 mg by mouth. 80mg AM, 40mg PM., Disp: , Rfl:   •  glipiZIDE (GLUCOTROL) 10 MG tablet, Take  by mouth., Disp: , Rfl:   •  hydrALAZINE (APRESOLINE) 25 MG tablet, Take 50 mg by mouth 3 (three) times a day., Disp: , Rfl:   •  HYDROcodone-acetaminophen (LORTAB 7.5) 7.5-500 MG per tablet, Take  by mouth., Disp: , Rfl:   •  insulin glargine (LANTUS) 100 UNIT/ML injection, Inject 50 Units under the skin Daily., Disp: , Rfl:   •  insulin lispro (humaLOG) 100 UNIT/ML injection, Inject 25 Units under the skin 4 (Four) Times a Day., Disp: , Rfl:   •  ipratropium (ATROVENT HFA) 17 MCG/ACT inhaler, Inhale 2 puffs 4 (Four) Times a Day., Disp: , Rfl:   •  ipratropium-albuterol (DUO-NEB) 0.5-2.5 mg/mL nebulizer, USE 1 VIAL IN NEBULZIER EVERY 6 HOURS, Disp: , Rfl: 5  •  isosorbide mononitrate (IMDUR) 60 MG 24 hr tablet, , Disp: , Rfl:   •  levothyroxine (SYNTHROID, LEVOTHROID) 125 MCG tablet, Take 150 mcg by mouth., Disp: , Rfl:   •  LORazepam (ATIVAN) 0.5 MG tablet, Take 0.5 mg by mouth 2 (two) times a day., Disp: , Rfl:   •  losartan (COZAAR) 50 MG tablet, Take 50 mg by mouth Daily., Disp: , Rfl:   •  metoprolol tartrate (LOPRESSOR) 25 MG tablet, TAKE 1 TABLET BY MOUTH TWICE A DAY (Patient taking differently: 2 tablets twice daily), Disp: 60 tablet, Rfl: " 5  •  mometasone-formoterol (DULERA 100) 100-5 MCG/ACT inhaler, Inhale 2 puffs 2 (two) times a day., Disp: , Rfl:   •  montelukast (SINGULAIR) 10 MG tablet, Take 10 mg by mouth Every Night., Disp: , Rfl:   •  nitroglycerin (NITROSTAT) 0.4 MG SL tablet, Place 0.4 mg under the tongue Every 5 (Five) Minutes As Needed for chest pain. Take no more than 3 doses in 15 minutes., Disp: , Rfl:   •  pantoprazole (PROTONIX) 40 MG pack packet, Take  by mouth., Disp: , Rfl:   •  potassium chloride (K-DUR,KLOR-CON) 20 MEQ CR tablet, Take 20 mEq by mouth daily., Disp: , Rfl:   •  pravastatin (PRAVACHOL) 20 MG tablet, TAKE 1 TABLET EVERY DAY, Disp: 30 tablet, Rfl: 2  •  predniSONE (DELTASONE) 5 MG tablet, Take 1 tablet by mouth Daily., Disp: 30 tablet, Rfl: 2  •  pregabalin (LYRICA) 100 MG capsule, Take 100 mg by mouth 3 (three) times a day., Disp: , Rfl:   •  ranitidine (ZANTAC) 150 MG tablet, Take 150 mg by mouth 2 (two) times a day., Disp: , Rfl:   •  rOPINIRole (REQUIP) 1 MG tablet, Take  by mouth., Disp: , Rfl:   •  spironolactone (ALDACTONE) 50 MG tablet, Take 50 mg by mouth Daily., Disp: , Rfl:     The following portions of the patient's history were reviewed and updated as appropriate: allergies, current medications, past family history, past medical history, past social history, past surgical history and problem list.    Social History     Social History   • Marital status:      Spouse name: N/A   • Number of children: N/A   • Years of education: N/A     Social History Main Topics   • Smoking status: Never Smoker   • Smokeless tobacco: Not on file   • Alcohol use Not on file   • Drug use: Not on file   • Sexual activity: Not on file     Other Topics Concern   • Not on file     Social History Narrative       Review of Systems   Eyes: Positive for blurred vision.   Cardiovascular: Positive for chest pain (mid chest area ), dyspnea on exertion, leg swelling and orthopnea. Negative for irregular heartbeat, near-syncope,  "palpitations and syncope.   Respiratory: Positive for cough, shortness of breath and wheezing.         2 L O2 continuous   Neurological: Positive for dizziness. Negative for light-headedness.     Objective   Blood pressure 144/75, pulse 62, resp. rate 18, height 60\" (152.4 cm), weight (!) 365 lb (166 kg).    Physical Exam   Constitutional: She is oriented to person, place, and time. She appears well-developed and well-nourished. No distress.   Obese.  In a wheelchair.  She is on portable oxygen.   HENT:   Head: Normocephalic and atraumatic.   Eyes: Conjunctivae are normal. Right eye exhibits no discharge. Left eye exhibits no discharge.   Neck: Normal range of motion. Neck supple. Carotid bruit is not present.   Cardiovascular:   Difficult to assess heart sounds due to body habitus.   Pulmonary/Chest: Effort normal. No respiratory distress. She has no wheezes. She has rales (Few scattered). She exhibits no tenderness.   Musculoskeletal: Normal range of motion. She exhibits no edema.   Neurological: She is alert and oriented to person, place, and time.   Skin: Skin is warm and dry. No rash noted. She is not diaphoretic. No erythema. No pallor.   Psychiatric: She has a normal mood and affect. Her behavior is normal.   Nursing note and vitals reviewed.      ECG 12 Lead  Date/Time: 6/7/2017 1:36 PM  Performed by: JESUS SMITH  Authorized by: JESUS SMITH   Comparison: compared with previous ECG   Similar to previous ECG  Rhythm: sinus bradycardia  Rate: bradycardic  BPM: 49  Conduction: non-specific intraventricular conduction delay  QRS axis: normal  Other findings: PRWP  Clinical impression: non-specific ECG  Comments: .          Cardiac catheterization 7/1/15  B) Angiograms:   1) Coronary Arteriograms: On fluoroscopy, there was no significant epicardial  calcification noted.   2) Left main coronary artery is normal and bifurcates into a medium-sized left  anterior descending and left " circumflex system in the usual fashion.   3) Left anterior descending coronary artery gives rise to a small to  medium-size diagonal branch from the proximal to mid segment which appears to  be angiographically normal. The ongoing LAD in the mid segment is tortuous,  but overall there is mild plaquing disease noted in the LAD with proximal and  distal segments.   4) Left circumflex coronary artery is a medium caliber vessel. It gives rise  to a medium-size obtuse marginal branch with the proximal segment and a small  posterolateral branch distally. There is overall mild plaquing disease noted in  the left circumflex system as well.   5) Right coronary artery is small and nondominant vessel. It has diffuse  narrowing of about 50% in its distal segment.     IV: CONCLUSION AND COMMENTS: The patient is a 50-year-old  female  with recent episode of diastolic heart failure and gray zone troponin and some  risk factors for coronary artery disease. Her cardiac catheterization reveals:     A) Short and normal left main coronary artery.  B) Left anterior descending coronary artery has overall mild plaquing disease.  C) Left circumflex coronary artery is dominant for posterior circulation with  mild plaquing disease as well.   D) Right coronary artery is small and nondominant with about 50% diffuse  narrowing in the distal segment.      V: RECOMMENDATIONS: In view of mild and nonobstructive disease, I would  continue to emphasize on risk factor modification and perhaps continue with  low-dose aspirin as tolerated for now.   Assessment:          Diagnosis Plan   1. ASCVD (arteriosclerotic cardiovascular disease) mild and nonobstructive in July 2015.  ECG 12 Lead    Stress Test With Myocardial Perfusion (1 Day)   2. Essential hypertension     3. Morbid obesity, unspecified obesity type     4. Dyslipidemia     5. Sinus bradycardia     6. Elevated troponin I level  Stress Test With Myocardial Perfusion (1 Day)   7. Chest  pain, unspecified type  Stress Test With Myocardial Perfusion (1 Day)        Plan:       1. Echocardiogram from [facility] in [place].   2. Will get records from [facility] including discharge summary  3. Will evaluate further with a dobutamine stress test due to patient being in a wheelchair and with severe COPD.   4. Increase Imdur to 90mg QD.  5. Decrease diltiazem to 180mg QD due to sinus bradycardia in the 40s during the daytime.  6. We'll follow-up in 3-4 weeks or sooner if needed.    No Follow-up on file.    I appreciate the opportunity to participate in this patient's cardiovascular care.    Best Regards,    Allison Osorio PA-C

## 2017-06-09 ENCOUNTER — TELEPHONE (OUTPATIENT)
Dept: CARDIOLOGY | Facility: CLINIC | Age: 52
End: 2017-06-09

## 2017-06-09 NOTE — TELEPHONE ENCOUNTER
----- Message from HAL Troy sent at 6/7/2017  2:36 PM EDT -----  Please get echo report from [facility] from March 2017.  Please get recent discharge summary from [facility].      Called [facility] and they stated that Guillermina hadn't been to [facility] since 2013  Called [facility] requested discharge summary.

## 2017-06-21 ENCOUNTER — TELEPHONE (OUTPATIENT)
Dept: CARDIOLOGY | Facility: CLINIC | Age: 52
End: 2017-06-21

## 2017-06-21 NOTE — TELEPHONE ENCOUNTER
"Pt called requesting a heart cath instead of a dobutamine stress test.  She states she could not do the test today because she \"had a bad breathing spell\".  She also states she is afraid the test will cause her to \"be thrown into a breathing fit\".    "

## 2017-07-17 RX ORDER — PREDNISONE 1 MG/1
TABLET ORAL
Qty: 30 TABLET | Refills: 2 | Status: SHIPPED | OUTPATIENT
Start: 2017-07-17

## 2017-08-17 ENCOUNTER — OFFICE VISIT (OUTPATIENT)
Dept: CARDIOLOGY | Facility: CLINIC | Age: 52
End: 2017-08-17

## 2017-08-17 VITALS
HEART RATE: 73 BPM | RESPIRATION RATE: 18 BRPM | SYSTOLIC BLOOD PRESSURE: 113 MMHG | OXYGEN SATURATION: 95 % | DIASTOLIC BLOOD PRESSURE: 60 MMHG | HEIGHT: 60 IN

## 2017-08-17 DIAGNOSIS — E66.01 MORBID OBESITY, UNSPECIFIED OBESITY TYPE (HCC): ICD-10-CM

## 2017-08-17 DIAGNOSIS — I50.33 ACUTE ON CHRONIC DIASTOLIC CONGESTIVE HEART FAILURE (HCC): Primary | ICD-10-CM

## 2017-08-17 DIAGNOSIS — I10 ESSENTIAL HYPERTENSION: ICD-10-CM

## 2017-08-17 DIAGNOSIS — R60.0 PEDAL EDEMA: ICD-10-CM

## 2017-08-17 DIAGNOSIS — I25.10 ASCVD (ARTERIOSCLEROTIC CARDIOVASCULAR DISEASE): ICD-10-CM

## 2017-08-17 DIAGNOSIS — E78.5 DYSLIPIDEMIA: ICD-10-CM

## 2017-08-17 PROCEDURE — 99214 OFFICE O/P EST MOD 30 MIN: CPT | Performed by: PHYSICIAN ASSISTANT

## 2017-08-17 RX ORDER — PROMETHAZINE HYDROCHLORIDE 25 MG/1
25 TABLET ORAL EVERY 6 HOURS PRN
COMMUNITY

## 2017-08-17 RX ORDER — PREGABALIN 100 MG/1
100 CAPSULE ORAL 3 TIMES DAILY
COMMUNITY
End: 2017-08-17 | Stop reason: SDUPTHER

## 2017-08-17 RX ORDER — METOLAZONE 5 MG/1
TABLET ORAL
Qty: 15 TABLET | Refills: 0 | Status: SHIPPED | OUTPATIENT
Start: 2017-08-17 | End: 2017-11-21 | Stop reason: SDUPTHER

## 2017-08-17 RX ORDER — ISOSORBIDE MONONITRATE 30 MG/1
90 TABLET, EXTENDED RELEASE ORAL DAILY
Qty: 30 TABLET
Start: 2017-08-17 | End: 2017-11-21 | Stop reason: SDUPTHER

## 2017-08-17 RX ORDER — DILTIAZEM HYDROCHLORIDE 120 MG/1
120 CAPSULE, COATED, EXTENDED RELEASE ORAL DAILY
Qty: 30 CAPSULE | Refills: 3 | Status: SHIPPED | OUTPATIENT
Start: 2017-08-17 | End: 2017-11-20 | Stop reason: SDUPTHER

## 2017-08-17 RX ORDER — BUMETANIDE 2 MG/1
2 TABLET ORAL 2 TIMES DAILY
Qty: 60 TABLET | Refills: 3 | Status: SHIPPED | OUTPATIENT
Start: 2017-08-17 | End: 2017-11-03 | Stop reason: SDUPTHER

## 2017-08-17 NOTE — PROGRESS NOTES
Rosita Flores MD  Guillermina Mcqueen  1965 08/17/2017    Patient Active Problem List   Diagnosis   • Chest pain   • COPD (chronic obstructive pulmonary disease)   • HTN (hypertension)   • Dyslipidemia   • ASCVD (arteriosclerotic cardiovascular disease)       Dear Rosita Flores MD:    Chief Complaint   Patient presents with   • Follow-up     stress test not done   • Chest Pain     unchanged, recent hosp stay at [facility], echo done   • Shortness of Breath     O2   • Edema     all extremities       Subjective     Guillermina Mcqueen is a 52 y.o. female with a past medical history significant for Previously mild nonobstructive coronary artery disease on cardiac catheterization in July 2015 with 50% diffuse RCA stenosis. She also has a history of severe COPD with previous respiratory arrest requiring resuscitation, hypertension, and dyslipidemia. Since her last visit, she had another admission to [facility] with acute on chronic respiratory failure, COPD exacerbation, acute kidney injury, and acute on chronic diastolic congestive heart failure.  It was from 8/1/17 through 8/3/17.  Review of records revealed that her transthoracic echocardiogram was unable to visualize basically any structures in her heart.  She presents to the office today stating that her breathing has been gradually worsening as well as having increased edema since discharge.      Current Outpatient Prescriptions:   •  citalopram (CeleXA) 40 MG tablet, TAKE 1 TABLET BY MOUTH EVERY DAY, Disp: , Rfl: 5  •  cloNIDine (CATAPRES) 0.1 MG tablet, Take  by mouth., Disp: , Rfl:   •  [employer] STOOL SOFTENER 250 MG capsule, TAKE 1 CAPSULE EVERY DAY, Disp: , Rfl: 5  •  cyclobenzaprine (FLEXERIL) 5 MG tablet, Take 5 mg by mouth 3 (Three) Times a Day As Needed for muscle spasms., Disp: , Rfl:   •  diltiaZEM CD (CARDIZEM CD) 180 MG 24 hr capsule, Take 1 capsule by mouth Daily., Disp: 30 capsule, Rfl: 5  •  docusate calcium (SURFAK) 240 MG capsule, Take  by mouth.,  "Disp: , Rfl:   •  EASY TOUCH INSULIN SYRINGE 31G X 5/16\" 1 ML misc, , Disp: , Rfl: 12  •  fluticasone (FLOVENT HFA) 110 MCG/ACT inhaler, Inhale 2 puffs 2 (Two) Times a Day., Disp: 12 g, Rfl: 11  •  FREESTYLE LITE test strip, TEST TWICE ADAY, Disp: , Rfl: 5  •  furosemide (LASIX) 40 MG tablet, TAKE 2 TABLETS BY MOUTH EVERY MORNING AND TAKE 1 TABLET EVERY EVENING, Disp: , Rfl: 5  •  glipiZIDE (GLUCOTROL) 10 MG tablet, Take  by mouth., Disp: , Rfl:   •  hydrALAZINE (APRESOLINE) 25 MG tablet, Take 50 mg by mouth 3 (three) times a day., Disp: , Rfl:   •  HYDROcodone-acetaminophen (LORTAB 7.5) 7.5-500 MG per tablet, Take  by mouth., Disp: , Rfl:   •  insulin glargine (LANTUS) 100 UNIT/ML injection, Inject 50 Units under the skin Daily., Disp: , Rfl:   •  insulin lispro (humaLOG) 100 UNIT/ML injection, Inject 25 Units under the skin 4 (Four) Times a Day., Disp: , Rfl:   •  ipratropium (ATROVENT HFA) 17 MCG/ACT inhaler, Inhale 2 puffs 4 (Four) Times a Day., Disp: , Rfl:   •  ipratropium-albuterol (DUO-NEB) 0.5-2.5 mg/mL nebulizer, USE 1 VIAL IN NEBULZIER EVERY 6 HOURS, Disp: , Rfl: 5  •  isosorbide mononitrate (IMDUR) 60 MG 24 hr tablet, Take 1.5 tablets by mouth Daily., Disp: 45 tablet, Rfl: 5  •  levothyroxine (SYNTHROID, LEVOTHROID) 125 MCG tablet, Take 150 mcg by mouth., Disp: , Rfl:   •  LORazepam (ATIVAN) 0.5 MG tablet, Take 0.5 mg by mouth 2 (two) times a day., Disp: , Rfl:   •  losartan (COZAAR) 50 MG tablet, Take 50 mg by mouth Daily., Disp: , Rfl:   •  metoprolol tartrate (LOPRESSOR) 25 MG tablet, TAKE 1 TABLET BY MOUTH TWICE A DAY (Patient taking differently: 2 tablets twice daily), Disp: 60 tablet, Rfl: 5  •  mometasone-formoterol (DULERA 100) 100-5 MCG/ACT inhaler, Inhale 2 puffs 2 (two) times a day., Disp: , Rfl:   •  montelukast (SINGULAIR) 10 MG tablet, Take 10 mg by mouth Every Night., Disp: , Rfl:   •  nitroglycerin (NITROSTAT) 0.4 MG SL tablet, Place 0.4 mg under the tongue Every 5 (Five) Minutes As " Needed for chest pain. Take no more than 3 doses in 15 minutes., Disp: , Rfl:   •  pantoprazole (PROTONIX) 40 MG pack packet, Take  by mouth., Disp: , Rfl:   •  potassium chloride (K-DUR,KLOR-CON) 20 MEQ CR tablet, Take 20 mEq by mouth daily., Disp: , Rfl:   •  pravastatin (PRAVACHOL) 20 MG tablet, TAKE 1 TABLET EVERY DAY, Disp: 30 tablet, Rfl: 2  •  predniSONE (DELTASONE) 5 MG tablet, TAKE 1 TABLET BY MOUTH DAILY., Disp: 30 tablet, Rfl: 2  •  pregabalin (LYRICA) 100 MG capsule, Take 100 mg by mouth 3 (three) times a day., Disp: , Rfl:   •  rOPINIRole (REQUIP) 1 MG tablet, Take  by mouth., Disp: , Rfl:   •  arformoterol (BROVANA) 15 MCG/2ML nebulizer solution, Take 2 mL by nebulization 2 (Two) Times a Day., Disp: 120 mL, Rfl: 6  •  aspirin 81 MG chewable tablet, Chew., Disp: , Rfl:   •  budesonide (PULMICORT) 0.5 MG/2ML nebulizer solution, Take 2 mL by nebulization Every 12 (Twelve) Hours., Disp: 60 each, Rfl: 6  •  furosemide (LASIX) 80 MG tablet, Take 80 mg by mouth. 80mg AM, 40mg PM., Disp: , Rfl:   •  ranitidine (ZANTAC) 150 MG tablet, Take 150 mg by mouth 2 (two) times a day., Disp: , Rfl:   •  spironolactone (ALDACTONE) 50 MG tablet, Take 50 mg by mouth Daily., Disp: , Rfl:     The following portions of the patient's history were reviewed and updated as appropriate: allergies, current medications, past family history, past medical history, past social history, past surgical history and problem list.    Social History     Social History   • Marital status:      Spouse name: N/A   • Number of children: N/A   • Years of education: N/A     Occupational History   • Not on file.     Social History Main Topics   • Smoking status: Never Smoker   • Smokeless tobacco: Never Used   • Alcohol use No   • Drug use: No   • Sexual activity: Not on file     Other Topics Concern   • Not on file     Social History Narrative     Review of Systems   Cardiovascular: Positive for chest pain and leg swelling.   Respiratory:  "Positive for shortness of breath.      Objective   Blood pressure 113/60, pulse 73, resp. rate 18, height 60\" (152.4 cm), SpO2 95 %.   Physical Exam   Constitutional: She is oriented to person, place, and time.   Morbidly obese  female.  Alert and oriented ×3.  She is in a wheelchair.  She is on chronic O2.   HENT:   Head: Normocephalic and atraumatic.   Neck:   Cannot appreciate JVD or carotid bruits due to neck size.   Cardiovascular: Normal rate and regular rhythm.  Exam reveals no gallop and no friction rub.    No murmur heard.  Difficult to auscultate heart sounds due to body habitus   Pulmonary/Chest: No respiratory distress. She has no wheezes. She has rales (Bibasilar rales present).   Musculoskeletal: She exhibits edema (2-3+ edema bilateral lower extremities).   Neurological: She is alert and oriented to person, place, and time.   Skin: Skin is warm and dry.   Psychiatric: She has a normal mood and affect. Her behavior is normal.   Nursing note and vitals reviewed.    Procedures    Assessment:          Diagnosis Plan   1. ASCVD (arteriosclerotic cardiovascular disease) previously mild and nonobstructive in 2015.     2. Acute on chronic diastolic congestive heart failure     3. Essential hypertension     4. Morbid obesity, unspecified obesity type     5. Dyslipidemia          Plan:       1. Stop lasix.   2. Start bumetanide 2mg BID.   3. Start metolazone 5mg QD X5 days, then stop.   4. Increase potassium 20meq AM, 10meq pm daily only while taking metolazone, then back to 20meq once daily.   5. Decrease diltiazem to 120mg as it may be contributing to edema.   6. We will obtain a PA lateral chest x-ray as well as basic metabolic panel and beta and atretic peptide level on Monday.  7. We'll evaluate cardiac function with a MUGA scan.  8. Follow up in 3 weeks.     No Follow-up on file.    I appreciate the opportunity to participate in this patient's cardiovascular care.    Best Regards,    Allison " ROGELIO Osorio

## 2017-08-21 ENCOUNTER — HOSPITAL ENCOUNTER (OUTPATIENT)
Dept: GENERAL RADIOLOGY | Facility: HOSPITAL | Age: 52
Discharge: HOME | End: 2017-08-21
Admitting: PHYSICIAN ASSISTANT

## 2017-08-21 ENCOUNTER — TELEPHONE (OUTPATIENT)
Dept: CARDIOLOGY | Facility: CLINIC | Age: 52
End: 2017-08-21

## 2017-08-21 ENCOUNTER — LAB (OUTPATIENT)
Dept: LAB | Facility: HOSPITAL | Age: 52
End: 2017-08-21

## 2017-08-21 DIAGNOSIS — I50.33 ACUTE ON CHRONIC DIASTOLIC CONGESTIVE HEART FAILURE (HCC): ICD-10-CM

## 2017-08-21 LAB
ANION GAP SERPL CALCULATED.3IONS-SCNC: 15.9 MMOL/L (ref 3.6–11.2)
BNP SERPL-MCNC: 32 PG/ML (ref 0–100)
BUN BLD-MCNC: 38 MG/DL (ref 7–21)
BUN/CREAT SERPL: 23.5 (ref 7–25)
CALCIUM SPEC-SCNC: 9.7 MG/DL (ref 7.7–10)
CHLORIDE SERPL-SCNC: 81 MMOL/L (ref 99–112)
CO2 SERPL-SCNC: 38.1 MMOL/L (ref 24.3–31.9)
CREAT BLD-MCNC: 1.62 MG/DL (ref 0.43–1.29)
GFR SERPL CREATININE-BSD FRML MDRD: 33 ML/MIN/1.73
GLUCOSE BLD-MCNC: 320 MG/DL (ref 70–110)
OSMOLALITY SERPL CALC.SUM OF ELEC: 291.4 MOSM/KG (ref 273–305)
POTASSIUM BLD-SCNC: 3.5 MMOL/L (ref 3.5–5.3)
SODIUM BLD-SCNC: 135 MMOL/L (ref 135–153)

## 2017-08-21 PROCEDURE — 71020 HC CHEST PA AND LATERAL: CPT

## 2017-08-21 PROCEDURE — 80048 BASIC METABOLIC PNL TOTAL CA: CPT | Performed by: PHYSICIAN ASSISTANT

## 2017-08-21 PROCEDURE — 83880 ASSAY OF NATRIURETIC PEPTIDE: CPT | Performed by: PHYSICIAN ASSISTANT

## 2017-08-21 PROCEDURE — 71020 XR CHEST PA AND LATERAL: CPT | Performed by: RADIOLOGY

## 2017-08-21 PROCEDURE — 36415 COLL VENOUS BLD VENIPUNCTURE: CPT

## 2017-08-21 NOTE — TELEPHONE ENCOUNTER
Guillermina is asking if you can test her urine when she comes to get her labs performed today as she is having some dysuria. I told her if you did order this test it would be with the blood orders at the hospital, if not she would have to contact her PCP for this issue.   She voiced understanding.

## 2017-08-28 ENCOUNTER — LAB (OUTPATIENT)
Dept: LAB | Facility: HOSPITAL | Age: 52
End: 2017-08-28

## 2017-08-28 DIAGNOSIS — Z79.899 MEDICATION DOSE CHANGED: ICD-10-CM

## 2017-08-28 LAB
ANION GAP SERPL CALCULATED.3IONS-SCNC: ABNORMAL MMOL/L (ref 3.6–11.2)
BUN BLD-MCNC: 23 MG/DL (ref 7–21)
BUN/CREAT SERPL: 26.7 (ref 7–25)
CALCIUM SPEC-SCNC: 10.1 MG/DL (ref 7.7–10)
CHLORIDE SERPL-SCNC: 89 MMOL/L (ref 99–112)
CO2 SERPL-SCNC: >40 MMOL/L (ref 24.3–31.9)
CREAT BLD-MCNC: 0.86 MG/DL (ref 0.43–1.29)
GFR SERPL CREATININE-BSD FRML MDRD: 69 ML/MIN/1.73
GLUCOSE BLD-MCNC: 170 MG/DL (ref 70–110)
OSMOLALITY SERPL CALC.SUM OF ELEC: 288.9 MOSM/KG (ref 273–305)
POTASSIUM BLD-SCNC: 3.6 MMOL/L (ref 3.5–5.3)
SODIUM BLD-SCNC: 141 MMOL/L (ref 135–153)

## 2017-08-28 PROCEDURE — 36415 COLL VENOUS BLD VENIPUNCTURE: CPT

## 2017-08-28 PROCEDURE — 80048 BASIC METABOLIC PNL TOTAL CA: CPT | Performed by: PHYSICIAN ASSISTANT

## 2017-09-07 ENCOUNTER — TELEPHONE (OUTPATIENT)
Dept: CARDIOLOGY | Facility: CLINIC | Age: 52
End: 2017-09-07

## 2017-09-08 ENCOUNTER — APPOINTMENT (OUTPATIENT)
Dept: NUCLEAR MEDICINE | Facility: HOSPITAL | Age: 52
End: 2017-09-08

## 2017-09-25 ENCOUNTER — HOSPITAL ENCOUNTER (OUTPATIENT)
Dept: NUCLEAR MEDICINE | Facility: HOSPITAL | Age: 52
Discharge: HOME | End: 2017-09-25

## 2017-09-25 DIAGNOSIS — I25.10 ASCVD (ARTERIOSCLEROTIC CARDIOVASCULAR DISEASE): ICD-10-CM

## 2017-09-25 DIAGNOSIS — E66.01 MORBID OBESITY, UNSPECIFIED OBESITY TYPE (HCC): ICD-10-CM

## 2017-09-25 DIAGNOSIS — I50.33 ACUTE ON CHRONIC DIASTOLIC CONGESTIVE HEART FAILURE (HCC): ICD-10-CM

## 2017-09-25 PROCEDURE — 0 TECHNETIUM LABELED RED BLOOD CELLS: Performed by: PHYSICIAN ASSISTANT

## 2017-09-25 PROCEDURE — 25010000002 HEPARIN FLUSH (PORCINE) 100 UNIT/ML SOLUTION

## 2017-09-25 PROCEDURE — A9560 TC99M LABELED RBC: HCPCS | Performed by: PHYSICIAN ASSISTANT

## 2017-09-25 PROCEDURE — 78472 GATED HEART PLANAR SINGLE: CPT | Performed by: INTERNAL MEDICINE

## 2017-09-25 PROCEDURE — 78472 GATED HEART PLANAR SINGLE: CPT

## 2017-09-25 RX ADMIN — TECHNETIUM TC 99M-LABELED RED BLOOD CELLS 1 DOSE: KIT at 09:25

## 2017-09-28 LAB
MAXIMAL PREDICTED HEART RATE: 168 BPM
STRESS TARGET HR: 143 BPM

## 2017-10-16 ENCOUNTER — TELEPHONE (OUTPATIENT)
Dept: CARDIOLOGY | Facility: CLINIC | Age: 52
End: 2017-10-16

## 2017-10-16 NOTE — TELEPHONE ENCOUNTER
Pt called wanted to know her Stress test results. I advised her of her results she expressed understanding. She stated she had an apt today and stated she wasn't able to make it due to having a breathing spell. I advised her to go to the ER if she has another spell or if her breathing gets worse. She expressed understanding.   Notes Recorded by HAL Troy on 10/2/2017 at 10:06 AM  Heart scan ok.

## 2017-11-03 ENCOUNTER — TELEPHONE (OUTPATIENT)
Dept: CARDIOLOGY | Facility: CLINIC | Age: 52
End: 2017-11-03

## 2017-11-03 DIAGNOSIS — Z79.899 DRUG THERAPY: Primary | ICD-10-CM

## 2017-11-03 RX ORDER — BUMETANIDE 2 MG/1
2 TABLET ORAL 2 TIMES DAILY
Qty: 60 TABLET | Refills: 0 | Status: SHIPPED | OUTPATIENT
Start: 2017-11-03 | End: 2017-12-20 | Stop reason: SDUPTHER

## 2017-11-03 NOTE — TELEPHONE ENCOUNTER
Pt called stating she cannot make her appt today, she has been rescheduled for 11/21/17 at 1:40pm.  She is wanting to know if she can have a refill of her bumex and zaroxolyn.

## 2017-11-03 NOTE — TELEPHONE ENCOUNTER
Zaroxolyn was supposed to be d/c'd after 5 days as noted in last visit. Is she still taking it? Recheck BMP. Ok to refill bumex. She has canceled 3 appts. She will need to keep next appt for further refills.

## 2017-11-03 NOTE — TELEPHONE ENCOUNTER
"Advised pt; she states she did stop the zaroxolyn and it was the only thing that makes her \"use the bathroom\".  She says she will go to  the bumex and get lab done.  She is aware to keep follow up for further refills.    "

## 2017-11-20 RX ORDER — DILTIAZEM HYDROCHLORIDE 120 MG/1
CAPSULE, COATED, EXTENDED RELEASE ORAL
Qty: 30 CAPSULE | Refills: 2 | Status: SHIPPED | OUTPATIENT
Start: 2017-11-20

## 2017-11-21 ENCOUNTER — OFFICE VISIT (OUTPATIENT)
Dept: CARDIOLOGY | Facility: CLINIC | Age: 52
End: 2017-11-21

## 2017-11-21 VITALS
WEIGHT: 293 LBS | DIASTOLIC BLOOD PRESSURE: 84 MMHG | BODY MASS INDEX: 57.52 KG/M2 | OXYGEN SATURATION: 98 % | RESPIRATION RATE: 18 BRPM | HEIGHT: 60 IN | HEART RATE: 69 BPM | SYSTOLIC BLOOD PRESSURE: 145 MMHG

## 2017-11-21 DIAGNOSIS — E78.5 DYSLIPIDEMIA: ICD-10-CM

## 2017-11-21 DIAGNOSIS — I25.10 ASCVD (ARTERIOSCLEROTIC CARDIOVASCULAR DISEASE): ICD-10-CM

## 2017-11-21 DIAGNOSIS — I10 ESSENTIAL HYPERTENSION: ICD-10-CM

## 2017-11-21 DIAGNOSIS — E66.01 MORBID OBESITY WITH BMI OF 70 AND OVER, ADULT (HCC): ICD-10-CM

## 2017-11-21 DIAGNOSIS — I50.32 CHRONIC DIASTOLIC CHF (CONGESTIVE HEART FAILURE) (HCC): Primary | ICD-10-CM

## 2017-11-21 PROCEDURE — 99214 OFFICE O/P EST MOD 30 MIN: CPT | Performed by: PHYSICIAN ASSISTANT

## 2017-11-21 RX ORDER — METOLAZONE 5 MG/1
TABLET ORAL
Qty: 30 TABLET | Refills: 2 | Status: SHIPPED | OUTPATIENT
Start: 2017-11-21

## 2017-11-21 RX ORDER — ISOSORBIDE MONONITRATE 60 MG/1
60 TABLET, EXTENDED RELEASE ORAL DAILY
Qty: 45 TABLET | Refills: 5 | Status: SHIPPED | OUTPATIENT
Start: 2017-11-21

## 2017-11-21 RX ORDER — BUSPIRONE HYDROCHLORIDE 5 MG/1
5 TABLET ORAL 2 TIMES DAILY
COMMUNITY

## 2017-11-21 NOTE — PROGRESS NOTES
" Rosita Flores MD  Guillermina Mcqueen  1965 11/21/2017    Patient Active Problem List   Diagnosis   • Chest pain   • COPD (chronic obstructive pulmonary disease)   • HTN (hypertension)   • Dyslipidemia   • ASCVD (arteriosclerotic cardiovascular disease)     Dear Rosita Flores MD:    Chief Complaint   Patient presents with   • ASCVD   • Shortness of Breath     \"having a hard time breathing\"     Subjective     Guillermina Mcqueen is a 52 y.o. female with a past medical history significant for previously mild nonobstructive coronary artery disease on cardiac catheterization in July 2015 with 50% diffuse RCA stenosis. She also has a history of severe COPD with previous respiratory arrest requiring resuscitation, hypertension, and dyslipidemia. After her last visit in August 2017, she had a CXR which was unremarkable. BNP was normal at 32. She had a MUGA scan revealing normal wall motion and EF at 62%. She presents back to the office today for follow-up visit. She reports that she still has shortness of breath as well as intermittent edema of the lower extremities.  Denies any chest pain or discomfort.  Blood pressure runs from 130 to 190 systolic and from  diastolic.  She usually takes an extra hydralazine if it is running high.  She had recent labs with primary care October 31, 2017.      Current Outpatient Prescriptions:   •  arformoterol (BROVANA) 15 MCG/2ML nebulizer solution, Take 2 mL by nebulization 2 (Two) Times a Day., Disp: 120 mL, Rfl: 6  •  aspirin 81 MG chewable tablet, Chew., Disp: , Rfl:   •  budesonide (PULMICORT) 0.5 MG/2ML nebulizer solution, Take 2 mL by nebulization Every 12 (Twelve) Hours., Disp: 60 each, Rfl: 6  •  bumetanide (BUMEX) 2 MG tablet, Take 1 tablet by mouth 2 (Two) Times a Day., Disp: 60 tablet, Rfl: 0  •  busPIRone (BUSPAR) 5 MG tablet, Take 5 mg by mouth 2 (Two) Times a Day., Disp: , Rfl:   •  citalopram (CeleXA) 40 MG tablet, 20 mg Daily., Disp: , Rfl: 5  •  cloNIDine " "(CATAPRES) 0.1 MG tablet, Take  by mouth 3 (Three) Times a Day., Disp: , Rfl:   •  [employer] STOOL SOFTENER 250 MG capsule, TAKE 1 CAPSULE EVERY DAY, Disp: , Rfl: 5  •  cyclobenzaprine (FLEXERIL) 5 MG tablet, Take 5 mg by mouth 3 (Three) Times a Day As Needed for muscle spasms., Disp: , Rfl:   •  diltiaZEM CD (CARDIZEM CD) 120 MG 24 hr capsule, TAKE 1 CAPSULE BY MOUTH DAILY., Disp: 30 capsule, Rfl: 2  •  docusate calcium (SURFAK) 240 MG capsule, Take 250 mg by mouth Daily., Disp: , Rfl:   •  EASY TOUCH INSULIN SYRINGE 31G X 5/16\" 1 ML misc, , Disp: , Rfl: 12  •  fluticasone (FLOVENT HFA) 110 MCG/ACT inhaler, Inhale 2 puffs 2 (Two) Times a Day., Disp: 12 g, Rfl: 11  •  FREESTYLE LITE test strip, TEST TWICE ADAY, Disp: , Rfl: 5  •  glipiZIDE (GLUCOTROL) 10 MG tablet, Take  by mouth., Disp: , Rfl:   •  hydrALAZINE (APRESOLINE) 25 MG tablet, Take 50 mg by mouth 3 (three) times a day., Disp: , Rfl:   •  HYDROcodone-acetaminophen (LORTAB 7.5) 7.5-500 MG per tablet, Take  by mouth., Disp: , Rfl:   •  insulin glargine (LANTUS) 100 UNIT/ML injection, Inject 50 Units under the skin Daily., Disp: , Rfl:   •  insulin lispro (humaLOG) 100 UNIT/ML injection, Inject 25 Units under the skin 4 (Four) Times a Day., Disp: , Rfl:   •  ipratropium (ATROVENT HFA) 17 MCG/ACT inhaler, Inhale 2 puffs 4 (Four) Times a Day., Disp: , Rfl:   •  ipratropium-albuterol (DUO-NEB) 0.5-2.5 mg/mL nebulizer, USE 1 VIAL IN NEBULZIER EVERY 6 HOURS, Disp: , Rfl: 5  •  isosorbide mononitrate (IMDUR) 60 MG 24 hr tablet, Take 1 tablet by mouth Daily., Disp: 45 tablet, Rfl: 5  •  levothyroxine (SYNTHROID, LEVOTHROID) 125 MCG tablet, Take 150 mcg by mouth., Disp: , Rfl:   •  LORazepam (ATIVAN) 0.5 MG tablet, Take 0.5 mg by mouth 2 (two) times a day., Disp: , Rfl:   •  losartan (COZAAR) 50 MG tablet, Take 50 mg by mouth Daily., Disp: , Rfl:   •  metOLazone (ZAROXOLYN) 5 MG tablet, Take 5mg once daily X2 days, then once daily as needed for increased swelling/shortness " "of breath., Disp: 30 tablet, Rfl: 2  •  mometasone-formoterol (DULERA 100) 100-5 MCG/ACT inhaler, Inhale 2 puffs 2 (two) times a day., Disp: , Rfl:   •  montelukast (SINGULAIR) 10 MG tablet, Take 10 mg by mouth Every Night., Disp: , Rfl:   •  nitroglycerin (NITROSTAT) 0.4 MG SL tablet, Place 0.4 mg under the tongue Every 5 (Five) Minutes As Needed for chest pain. Take no more than 3 doses in 15 minutes., Disp: , Rfl:   •  pantoprazole (PROTONIX) 40 MG pack packet, Take  by mouth., Disp: , Rfl:   •  potassium chloride (K-DUR,KLOR-CON) 20 MEQ CR tablet, Take 20 mEq by mouth daily., Disp: , Rfl:   •  pravastatin (PRAVACHOL) 20 MG tablet, TAKE 1 TABLET EVERY DAY, Disp: 30 tablet, Rfl: 2  •  predniSONE (DELTASONE) 5 MG tablet, TAKE 1 TABLET BY MOUTH DAILY., Disp: 30 tablet, Rfl: 2  •  pregabalin (LYRICA) 100 MG capsule, Take 100 mg by mouth 3 (three) times a day., Disp: , Rfl:   •  promethazine (PHENERGAN) 25 MG tablet, Take 25 mg by mouth Every 6 (Six) Hours As Needed., Disp: , Rfl:   •  ranitidine (ZANTAC) 150 MG tablet, Take 150 mg by mouth 2 (two) times a day., Disp: , Rfl:   •  rOPINIRole (REQUIP) 1 MG tablet, Take  by mouth 2 (Two) Times a Day., Disp: , Rfl:     The following portions of the patient's history were reviewed and updated as appropriate: allergies, current medications, past family history, past medical history, past social history, past surgical history and problem list.    Review of Systems   Cardiovascular: Positive for dyspnea on exertion, leg swelling and orthopnea. Negative for chest pain, irregular heartbeat, near-syncope and palpitations.   Respiratory: Positive for shortness of breath.         On O2       Objective   Blood pressure 145/84, pulse 69, resp. rate 18, height 60\" (152.4 cm), weight (!) 380 lb (172 kg), SpO2 98 %.   Body mass index is 74.21 kg/(m^2).      Physical Exam   Constitutional: She is oriented to person, place, and time. She appears well-developed and well-nourished. No " distress.   Morbidly obese  female.  She is in a wheelchair.   HENT:   Head: Normocephalic and atraumatic.   Eyes: Conjunctivae are normal. Right eye exhibits no discharge. Left eye exhibits no discharge.   Neck: Normal range of motion. Neck supple. Carotid bruit is not present.   Cardiovascular: Normal rate, regular rhythm and normal heart sounds.  Exam reveals no gallop and no friction rub.    No murmur heard.  Pulmonary/Chest: Effort normal. No respiratory distress. She has no wheezes. She has rales (Few bibasilar rales.). She exhibits no tenderness.   Musculoskeletal: Normal range of motion. She exhibits edema (1+ edema bilateral lower extremities.).   Neurological: She is alert and oriented to person, place, and time.   Skin: Skin is warm and dry. No rash noted. She is not diaphoretic. No erythema. No pallor.   Psychiatric: She has a normal mood and affect. Her behavior is normal.   Nursing note and vitals reviewed.    Procedures     MUGA scan 09/25/17  Interpretation Summary   · Normal wall motion  · EF is calculated at 62%         Ref Range & Units 3mo ago      BNP 0.0 - 100.0 pg/mL 32.0   Resulting Agency   [facility]      Specimen Collected: 08/21/17 12:55 PM Last Resulted: 08/21/17  1:40 PM          EXAMINATION: XR CHEST PA AND LATERAL- 08/21/17      CLINICAL INDICATION:     Shortness of breath; I50.33-Acute on chronic  diastolic (congestive) heart failure      TECHNIQUE:  XR CHEST PA AND LATERAL-       COMPARISON: NONE       FINDINGS:   BIBASILAR ATELECTASIS.  Heart and mediastinum contours are unremarkable.  No pleural effusion.  No pneumothorax.   Bony and soft tissue structures are unremarkable.      IMPRESSION:  No radiographic evidence of acute cardiac or pulmonary  disease.      This report was finalized on 8/21/2017 1:20 PM by Dr. Sander Anderson MD.    Left heart Catheterization 07/06/15  B)  Angiograms:   1)  Coronary Arteriograms: On fluoroscopy, there was no significant  epicardial  calcification noted.   2)  Left main coronary artery is normal and bifurcates into a medium-sized left  anterior descending and left circumflex system in the usual fashion.   3)  Left anterior descending coronary artery gives rise to a small to  medium-size diagonal branch from the proximal to mid segment which appears to  be angiographically normal.  The ongoing LAD in the mid segment is tortuous,  but overall there is mild plaquing disease noted in the LAD with proximal and  distal segments.   4)  Left circumflex coronary artery is a medium caliber vessel. It gives rise  to a medium-size obtuse marginal branch with the proximal segment and a small  posterolateral branch distally. There is overall mild plaquing disease noted in  the left circumflex system as well.   5)  Right coronary artery is small and nondominant vessel. It has diffuse  narrowing of about 50% in its distal segment.     IV: CONCLUSION AND COMMENTS:  The patient is a 50-year-old  female  with recent episode of diastolic heart failure and gray zone troponin and some  risk factors for coronary artery disease. Her cardiac catheterization reveals:     A)  Short and normal left main coronary artery.  B)  Left anterior descending coronary artery has overall mild plaquing disease.  C)  Left circumflex coronary artery is dominant for posterior circulation with  mild plaquing disease as well.   D)  Right coronary artery is small and nondominant with about 50% diffuse  narrowing in the distal segment.      V:  RECOMMENDATIONS: In view of mild and nonobstructive disease, I would  continue to emphasize on risk factor modification and perhaps continue with  low-dose aspirin as tolerated for now.      D:   BUNNY 07/01/2015 10:03:26  T:   luly 07/02/2015 15:30:40  JOB ID [id number]  [id number]  Revision Count:     0  cc:  Rosita Flores MD, ANASTACIA Munguia                              Signature:__________________________                                      Nima Hunt MD, [facility]  DO NOT TEXT EDIT THIS LINE :[id number]:  Assessment:          Diagnosis Plan   1. Chronic diastolic CHF (congestive heart failure)  Basic Metabolic Panel   2. ASCVD (arteriosclerotic cardiovascular disease)  Basic Metabolic Panel    Mild and nonobstructive on cardiac catheterization 7/1/15.  RCA with 50% diffuse narrowing, otherwise mild plaquing elsewhere.   3. Dyslipidemia     4. Essential hypertension  Basic Metabolic Panel   5. Morbid obesity with BMI of 70 and over, adult          Plan:       1. Patient and her  feel like the only thing that helps her breathe better is metolazone.  She was using it as needed before and only had to take it once or twice every few weeks.  This seemed to help her edema and shortness of breath.  2. We'll go ahead and give a prescription for metolazone 5 mg daily.  I've asked her to take it daily for the next 2 days then discontinue and only use as needed for increased edema or shortness of breath.  She is to take an extra 20 mEq of her potassium when she takes the metolazone.  3. We will get most recent labs from primary care from October.  If renal function stable, may consider increasing the losartan.  If renal function is poor, will increase hydralazine.  4. Recheck a basic metabolic panel in 2 weeks.  5. Check blood pressure at home and call if consistently running greater than 150/90.  6. Low sodium diet.   7. Follow-up in 2 months or sooner if needed.    No Follow-up on file.    I appreciate the opportunity to participate in this patient's cardiovascular care.    Best Regards,    Allison Osorio PA-C

## 2017-11-22 ENCOUNTER — TELEPHONE (OUTPATIENT)
Dept: CARDIOLOGY | Facility: CLINIC | Age: 52
End: 2017-11-22

## 2017-11-22 NOTE — TELEPHONE ENCOUNTER
Spoke w/Bhargavi at [facility], gave her a verbal for Isosorbide 90 mg, 1.5 tab daily of 60 mg, #45, 5RF.

## 2017-11-22 NOTE — TELEPHONE ENCOUNTER
Patient called stating that the isosorbide was sent in yesterday as 60 mg daily and it needs to be 90. Current rx reads 60 mg, 1 tab daily, #45.

## 2017-11-22 NOTE — TELEPHONE ENCOUNTER
She has to take 1.5 tab a day, that is why it is sent in for 45 tabs. I explained that it doesn't come in a 90mg.

## 2017-12-20 RX ORDER — BUMETANIDE 2 MG/1
TABLET ORAL
Qty: 60 TABLET | Refills: 0 | Status: SHIPPED | OUTPATIENT
Start: 2017-12-20

## 2017-12-29 DIAGNOSIS — Z79.899 DRUG THERAPY: Primary | ICD-10-CM
